# Patient Record
Sex: FEMALE | Race: WHITE
[De-identification: names, ages, dates, MRNs, and addresses within clinical notes are randomized per-mention and may not be internally consistent; named-entity substitution may affect disease eponyms.]

---

## 2021-05-19 ENCOUNTER — HOSPITAL ENCOUNTER (OUTPATIENT)
Dept: HOSPITAL 95 - ORSCSDS | Age: 51
Discharge: HOME | End: 2021-05-19
Payer: COMMERCIAL

## 2021-05-19 VITALS — BODY MASS INDEX: 44.41 KG/M2 | WEIGHT: 293 LBS | HEIGHT: 68 IN

## 2021-05-19 DIAGNOSIS — E03.9: ICD-10-CM

## 2021-05-19 DIAGNOSIS — E66.9: ICD-10-CM

## 2021-05-19 DIAGNOSIS — G56.01: Primary | ICD-10-CM

## 2021-05-19 DIAGNOSIS — F17.210: ICD-10-CM

## 2021-05-19 DIAGNOSIS — Z79.899: ICD-10-CM

## 2021-05-19 PROCEDURE — 01N54ZZ RELEASE MEDIAN NERVE, PERCUTANEOUS ENDOSCOPIC APPROACH: ICD-10-PCS

## 2021-10-13 ENCOUNTER — HOSPITAL ENCOUNTER (OUTPATIENT)
Dept: HOSPITAL 95 - ORSCSDS | Age: 51
Discharge: HOME | End: 2021-10-13
Payer: COMMERCIAL

## 2021-10-13 VITALS — BODY MASS INDEX: 44.41 KG/M2 | HEIGHT: 68 IN | WEIGHT: 293 LBS

## 2021-10-13 DIAGNOSIS — Z79.899: ICD-10-CM

## 2021-10-13 DIAGNOSIS — E66.01: ICD-10-CM

## 2021-10-13 DIAGNOSIS — F17.210: ICD-10-CM

## 2021-10-13 DIAGNOSIS — G56.02: Primary | ICD-10-CM

## 2021-10-13 DIAGNOSIS — E07.9: ICD-10-CM

## 2021-10-13 PROCEDURE — 01N54ZZ RELEASE MEDIAN NERVE, PERCUTANEOUS ENDOSCOPIC APPROACH: ICD-10-PCS

## 2021-10-13 NOTE — NUR
10/13/21 1119 Rosa Eduardo
VERSED 2MG GIVEN IV AT 1103. PULSE OX ON DURING PROCEDURE. DR. NARAYAN MARKED AND CONFIRMED INJECTION SITE. 10ML OF LIDOCAINE WITH
EPI GIVEN DURING PROCEEDURE, PT TOLERAED WELL.

## 2022-02-16 ENCOUNTER — HOSPITAL ENCOUNTER (OUTPATIENT)
Dept: HOSPITAL 95 - LAB SHORT | Age: 52
Discharge: HOME | End: 2022-02-16
Attending: PHYSICIAN ASSISTANT
Payer: COMMERCIAL

## 2022-02-16 DIAGNOSIS — L92.9: Primary | ICD-10-CM

## 2022-04-04 ENCOUNTER — HOSPITAL ENCOUNTER (OUTPATIENT)
Dept: HOSPITAL 95 - ORSCSDS | Age: 52
Discharge: HOME | End: 2022-04-04
Attending: STUDENT IN AN ORGANIZED HEALTH CARE EDUCATION/TRAINING PROGRAM
Payer: COMMERCIAL

## 2022-04-04 VITALS — WEIGHT: 269.18 LBS | BODY MASS INDEX: 40.8 KG/M2 | HEIGHT: 68 IN

## 2022-04-04 DIAGNOSIS — D12.2: ICD-10-CM

## 2022-04-04 DIAGNOSIS — Z12.11: Primary | ICD-10-CM

## 2022-04-04 DIAGNOSIS — D12.4: ICD-10-CM

## 2022-04-04 DIAGNOSIS — Z87.891: ICD-10-CM

## 2022-04-04 DIAGNOSIS — Z79.899: ICD-10-CM

## 2022-04-04 DIAGNOSIS — E66.01: ICD-10-CM

## 2022-04-04 DIAGNOSIS — E03.9: ICD-10-CM

## 2022-04-04 PROCEDURE — 0DBN8ZX EXCISION OF SIGMOID COLON, VIA NATURAL OR ARTIFICIAL OPENING ENDOSCOPIC, DIAGNOSTIC: ICD-10-PCS | Performed by: STUDENT IN AN ORGANIZED HEALTH CARE EDUCATION/TRAINING PROGRAM

## 2022-04-04 PROCEDURE — 0DBK8ZX EXCISION OF ASCENDING COLON, VIA NATURAL OR ARTIFICIAL OPENING ENDOSCOPIC, DIAGNOSTIC: ICD-10-PCS | Performed by: STUDENT IN AN ORGANIZED HEALTH CARE EDUCATION/TRAINING PROGRAM

## 2022-04-04 PROCEDURE — 0DBM8ZX EXCISION OF DESCENDING COLON, VIA NATURAL OR ARTIFICIAL OPENING ENDOSCOPIC, DIAGNOSTIC: ICD-10-PCS | Performed by: STUDENT IN AN ORGANIZED HEALTH CARE EDUCATION/TRAINING PROGRAM
